# Patient Record
Sex: FEMALE | Race: WHITE | NOT HISPANIC OR LATINO | Employment: FULL TIME | URBAN - METROPOLITAN AREA
[De-identification: names, ages, dates, MRNs, and addresses within clinical notes are randomized per-mention and may not be internally consistent; named-entity substitution may affect disease eponyms.]

---

## 2019-08-09 ENCOUNTER — OFFICE VISIT (OUTPATIENT)
Dept: CARDIOLOGY | Facility: CLINIC | Age: 49
End: 2019-08-09

## 2019-08-09 ENCOUNTER — RESULTS ENCOUNTER (OUTPATIENT)
Dept: CARDIOLOGY | Facility: CLINIC | Age: 49
End: 2019-08-09

## 2019-08-09 VITALS
HEART RATE: 75 BPM | BODY MASS INDEX: 53.36 KG/M2 | DIASTOLIC BLOOD PRESSURE: 68 MMHG | HEIGHT: 61 IN | WEIGHT: 282.6 LBS | SYSTOLIC BLOOD PRESSURE: 142 MMHG | RESPIRATION RATE: 18 BRPM

## 2019-08-09 DIAGNOSIS — R60.0 BILATERAL LEG EDEMA: ICD-10-CM

## 2019-08-09 DIAGNOSIS — R60.0 EDEMA LEG: ICD-10-CM

## 2019-08-09 DIAGNOSIS — R06.02 SHORTNESS OF BREATH: Primary | ICD-10-CM

## 2019-08-09 DIAGNOSIS — I10 ESSENTIAL HYPERTENSION: ICD-10-CM

## 2019-08-09 DIAGNOSIS — R06.09 DOE (DYSPNEA ON EXERTION): ICD-10-CM

## 2019-08-09 DIAGNOSIS — R06.09 DYSPNEA ON EXERTION: ICD-10-CM

## 2019-08-09 DIAGNOSIS — E78.2 MIXED HYPERLIPIDEMIA: ICD-10-CM

## 2019-08-09 PROCEDURE — 99204 OFFICE O/P NEW MOD 45 MIN: CPT | Performed by: INTERNAL MEDICINE

## 2019-08-09 RX ORDER — HYDROCHLOROTHIAZIDE 25 MG/1
25 TABLET ORAL DAILY
COMMUNITY
End: 2020-01-13

## 2019-08-09 RX ORDER — METOPROLOL SUCCINATE 50 MG/1
50 TABLET, EXTENDED RELEASE ORAL DAILY
Qty: 30 TABLET | Refills: 5 | Status: SHIPPED | OUTPATIENT
Start: 2019-08-09 | End: 2020-02-24

## 2019-08-09 RX ORDER — POTASSIUM CHLORIDE 750 MG/1
10 TABLET, FILM COATED, EXTENDED RELEASE ORAL DAILY
Qty: 30 TABLET | Refills: 5 | Status: SHIPPED | OUTPATIENT
Start: 2019-08-09 | End: 2020-01-13 | Stop reason: SDUPTHER

## 2019-08-09 RX ORDER — ATORVASTATIN CALCIUM 10 MG/1
10 TABLET, FILM COATED ORAL DAILY
COMMUNITY

## 2019-08-09 RX ORDER — FUROSEMIDE 40 MG/1
40 TABLET ORAL DAILY
Qty: 30 TABLET | Refills: 5 | Status: SHIPPED | OUTPATIENT
Start: 2019-08-09 | End: 2020-01-13 | Stop reason: SDUPTHER

## 2019-08-09 NOTE — PROGRESS NOTES
[facility]  Salvatore Lee MD PhD  Cardiology clinic note initial encounter  Subjective:     Encounter Date:08/09/2019      Patient ID: Kirsten Myers is a 49 y.o. female.    Chief Complaint:  Chief Complaint   Patient presents with   • Abnormal ECG   • Cardiomyopathy   • Edema       HPI:  History of Present Illness  At the pleasure seeing this very pleasant 49-year-old female who presents to cardiology clinic as a referral for enlarged heart she says diagnosed by chest x-ray as well as some underlying cardiomyopathy which she told was fine in the past as well as an abnormal EKG.  Her EKG is unremarkable other than findings of possible LVH with 12 mm R wave complex in aVL for filling criteria of LVH.  She has not had an echo for diagnosis of true cardiomyopathy or LVH in the past.  She does have treatment for hypertension and hyperlipidemia as well as obstructive sleep apnea is compliant with her CPAP regularly.  She is morbidly obese at 282 pounds and over the past few weeks to months she has had increasing bilateral lower extremity peripheral edema.  She sees a nurse practitioner Christiana Perez NP 50 mg p.o. metoprolol daily.  She is on no other antihypertensive therapy at this time her blood pressure today is 142/68.  She is a non-smoker.  She is not currently diabetic.  She has a history of LAP-BAND in the past and was on 150 pounds but is gained 100 pounds over the last few years.  She is a non-smoker.  Family history diabetes hypertension CAD in her father as well as mother.  She presents today with dyspnea on exertion of NYHA II-III symptoms.  She does report that she eats very unhealthfully with fast food and a lot of salt content which is her downfall she says.  She has not noticed much difference in her urine output or swelling with the increase in HCTZ.  She denies chest pain palpitations syncope presyncope PND orthopnea or other heart failure signs or symptoms other than bilateral  "extremity peripheral edema.  She does have hyperlipidemia.  she is treated with Lipitor 10 Millers p.o. daily her most recent LDL was 78.  She has no history of coronary artery disease, peripheral vascular disease or cerebrovascular disease or renovascular disease.  She has no other complaints today on extensive review of systems other than back pain which is chronic.          For primary care who recently increased her HCTZ to 50 mg twice daily in combination with the following portions of the patient's history were reviewed and updated as appropriate: allergies, current medications, past family history, past medical history, past social history, past surgical history and problem list.    Problem List:  Patient Active Problem List   Diagnosis   • HLD (hyperlipidemia)   • HTN (hypertension)   • Hypothyroidism   • Bilateral leg edema   • Dyspnea on exertion       Past Medical History:  Past Medical History:   Diagnosis Date   • HLD (hyperlipidemia)    • HTN (hypertension)    • Hypothyroidism        Past Surgical History:  No past surgical history on file.    Social History:  Social History     Socioeconomic History   • Marital status:      Spouse name: Not on file   • Number of children: Not on file   • Years of education: Not on file   • Highest education level: Not on file   Tobacco Use   • Smoking status: Never Smoker   Substance and Sexual Activity   • Alcohol use: No     Frequency: Never   • Drug use: No   • Sexual activity: Defer       Allergies:  Allergies   Allergen Reactions   • Codeine Unknown (See Comments)     .   • Erythromycin GI Intolerance   • Penicillin G Sodium Unknown (See Comments)     .       Immunizations:    There is no immunization history on file for this patient.    ROS:  ROS  As above, negative x12 point review of systems otherwise mentioned above in the HPI     Objective:         /68 (BP Location: Left arm, Patient Position: Sitting)   Pulse 75   Resp 18   Ht 154.9 cm (61\") "   Wt 128 kg (282 lb 9.6 oz)   BMI 53.40 kg/m²     Physical Exam  No acute distress alert and oriented x3 afebrile vital signs stable  Normocephalic atraumatic pupils equal round reactive light extract was tight bilaterally  Trachea midline neck supple no carotid bruits no significant JVD although she does have mild HJR  Regular rate and rhythm no rubs or gallops, 1 out of 6 systolic ejection murmur left axilla, no heave or parasternal lift noted  Lungs clear to auscultation bilaterally in all fields  Abdomen obese soft nontender nondistended bowel sounds positive  Extremities no clubbing or cyanosis but she has 2+ bilateral lower extremity peripheral edema pitting in nature to the knees  No neurologic deficits grossly ambulatory normal gait and station  Normal mood and affect  No significant lymphadenopathy noted  Skin warm and dry  No bony abnormalities grossly    In-Office Procedure(s):  Procedures    ASCVD RIsk Score::  The ASCVD Risk score (Alexandre LIMA Jr., et al., 2013) failed to calculate for the following reasons:    Cannot find a previous HDL lab    Cannot find a previous total cholesterol lab    Recent Radiology:  Imaging Results (most recent)     None          Lab Review:   No visits with results within 6 Month(s) from this visit.   Latest known visit with results is:   Conversion Encounter on 02/07/2015   Component Date Value   • WBC 02/07/2015 8.9    • RBC 02/07/2015 4.22    • Hemoglobin 02/07/2015 11.0*   • Hematocrit 02/07/2015 34.5    • MCV 02/07/2015 82    • MCH 02/07/2015 26.1*   • MCHC 02/07/2015 31.9    • RDW 02/07/2015 13.9    • Platelets 02/07/2015 297    • Neutrophil Rel % 02/07/2015 71    • Lymphocyte Rel % 02/07/2015 21    • Monocyte Rel % 02/07/2015 5    • Eosinophil Rel % 02/07/2015 2    • Basophil Rel % 02/07/2015 1    • Immature Cells 02/07/2015 CANCELED    • Neutrophils Absolute 02/07/2015 6.3    • Lymphocytes Absolute 02/07/2015 1.8    • Monocytes Absolute 02/07/2015 0.5    • Eosinophils  Absolute 02/07/2015 0.2    • Basophils Absolute 02/07/2015 0.1    • Immature Granulocyte Rel* 02/07/2015 0    • Immature Grans Absolute 02/07/2015 0.0    • nRBC 02/07/2015 CANCELED    • Hematology Comments: 02/07/2015 CANCELED    • Glucose 02/07/2015 CANCELED    • BUN 02/07/2015 CANCELED    • Creatinine 02/07/2015 CANCELED    • eGFR Non  Am 02/07/2015 CANCELED    • eGFR  Am 02/07/2015 CANCELED    • BUN/Creatinine Ratio 02/07/2015 CANCELED    • Sodium 02/07/2015 CANCELED    • Potassium 02/07/2015 CANCELED    • Chloride 02/07/2015 CANCELED    • Total CO2 02/07/2015 CANCELED    • Calcium 02/07/2015 CANCELED    • Total Protein 02/07/2015 CANCELED    • Albumin 02/07/2015 CANCELED    • Globulin 02/07/2015 CANCELED    • A/G Ratio 02/07/2015 CANCELED    • Total Bilirubin 02/07/2015 CANCELED    • Alkaline Phosphatase 02/07/2015 CANCELED    • AST (SGOT) 02/07/2015 CANCELED    • ALT (SGPT) 02/07/2015 CANCELED    • Total Cholesterol 02/07/2015 CANCELED    • Triglycerides 02/07/2015 CANCELED    • HDL Cholesterol 02/07/2015 CANCELED    • VLDL Cholesterol 02/07/2015 CANCELED    • LDL Cholesterol  02/07/2015 CANCELED    • Comment 02/07/2015 CANCELED    • LDL/HDL Ratio 02/07/2015 CANCELED    • TSH 02/07/2015 CANCELED    • T4, Total 02/07/2015 CANCELED    • T3 Uptake 02/07/2015 CANCELED    • Free Thyroxine Index 02/07/2015 CANCELED    • 25 Hydroxy, Vitamin D 02/07/2015 CANCELED    • Vitamin B-12 02/07/2015 CANCELED                 Assessment:          Diagnosis Plan   1. Shortness of breath  Adult Transthoracic Echo Complete W/ Cont if Necessary Per Protocol   2. Essential hypertension  potassium chloride (K-DUR) 10 MEQ CR tablet    Comprehensive Metabolic Panel    CBC & Differential    metoprolol succinate XL (TOPROL-XL) 50 MG 24 hr tablet   3. Edema leg  furosemide (LASIX) 40 MG tablet   4. DAILY (dyspnea on exertion)  Adult Transthoracic Echo Complete W/ Cont if Necessary Per Protocol   5. Mixed hyperlipidemia     6.  Dyspnea on exertion     7. Bilateral leg edema            Plan:      Bilateral lower extremity peripheral edema and shortness of breath dyspnea on exertion  2D echo for structural functional evaluation  Lasix 40 mg p.o. daily and potassium 10 mEq daily as well, as she is been refractory to 50 mg of HCTZ, stop HCTZ  CBC and CMP today to recheck renal function and hemoglobin is no labs since 2015 as above     Salt free diet discussed with the patient as this is likely a significant contributor to her peripheral edema      hypertension  Not at goal  Lasix as above, recheck in 2 weeks on return to clinic, add losartan or ARB at that time as she has had significant cough with ACE inhibitors in the past  Twice daily blood pressure log until she returns to clinic  Continue metoprolol XL 50 daily    Hyperlipidemia  LDL 78  Continue atorvastatin 10 mg p.o. Daily    Questionable cardiomyopathy or cardiac enlargement, 2D echo for definitive evaluation    Diet and weight loss discussed with this patient at length along with cessation of all salt.      Return to clinic in 2 weeks for follow-up       It is a pleasure to be involved in this patient's cardiac care.  Continue to follow and will communicate results as they become available      Salvatore Lee MD, PhD  08/09/19  .

## 2019-08-21 ENCOUNTER — HOSPITAL ENCOUNTER (OUTPATIENT)
Dept: CARDIOLOGY | Facility: HOSPITAL | Age: 49
Discharge: HOME | End: 2019-08-21
Admitting: INTERNAL MEDICINE

## 2019-08-21 ENCOUNTER — LAB (OUTPATIENT)
Dept: LAB | Facility: HOSPITAL | Age: 49
End: 2019-08-21

## 2019-08-21 VITALS
DIASTOLIC BLOOD PRESSURE: 81 MMHG | WEIGHT: 282 LBS | BODY MASS INDEX: 53.24 KG/M2 | HEIGHT: 61 IN | HEART RATE: 68 BPM | SYSTOLIC BLOOD PRESSURE: 135 MMHG

## 2019-08-21 DIAGNOSIS — R06.09 DOE (DYSPNEA ON EXERTION): ICD-10-CM

## 2019-08-21 DIAGNOSIS — R06.02 SHORTNESS OF BREATH: ICD-10-CM

## 2019-08-21 DIAGNOSIS — I10 ESSENTIAL HYPERTENSION, MALIGNANT: Primary | ICD-10-CM

## 2019-08-21 LAB
ALBUMIN SERPL-MCNC: 4.3 G/DL (ref 3.5–5.2)
ALBUMIN/GLOB SERPL: 1.3 G/DL
ALP SERPL-CCNC: 75 U/L (ref 39–117)
ALT SERPL W P-5'-P-CCNC: 34 U/L (ref 1–33)
ANION GAP SERPL CALCULATED.3IONS-SCNC: 7.6 MMOL/L (ref 5–15)
AORTIC DIMENSIONLESS INDEX: 0.4 (DI)
AST SERPL-CCNC: 36 U/L (ref 1–32)
BASOPHILS # BLD AUTO: 0.07 10*3/MM3 (ref 0–0.2)
BASOPHILS NFR BLD AUTO: 0.8 % (ref 0–1.5)
BH CV ECHO MEAS - ACS: 2 CM
BH CV ECHO MEAS - AO MAX PG (FULL): 11.4 MMHG
BH CV ECHO MEAS - AO MAX PG: 14.6 MMHG
BH CV ECHO MEAS - AO MEAN PG (FULL): 5 MMHG
BH CV ECHO MEAS - AO MEAN PG: 7 MMHG
BH CV ECHO MEAS - AO V2 MAX: 191 CM/SEC
BH CV ECHO MEAS - AO V2 MEAN: 123 CM/SEC
BH CV ECHO MEAS - AO V2 VTI: 38.1 CM
BH CV ECHO MEAS - ASC AORTA: 2.5 CM
BH CV ECHO MEAS - AVA(I,A): 1.5 CM^2
BH CV ECHO MEAS - AVA(I,D): 1.5 CM^2
BH CV ECHO MEAS - AVA(V,A): 1.6 CM^2
BH CV ECHO MEAS - AVA(V,D): 1.6 CM^2
BH CV ECHO MEAS - BSA(HAYCOCK): 2.4 M^2
BH CV ECHO MEAS - BSA: 2.2 M^2
BH CV ECHO MEAS - BZI_BMI: 53.3 KILOGRAMS/M^2
BH CV ECHO MEAS - BZI_METRIC_HEIGHT: 154.9 CM
BH CV ECHO MEAS - BZI_METRIC_WEIGHT: 127.9 KG
BH CV ECHO MEAS - EDV(CUBED): 157.5 ML
BH CV ECHO MEAS - EDV(MOD-SP2): 118 ML
BH CV ECHO MEAS - EDV(MOD-SP4): 120 ML
BH CV ECHO MEAS - EDV(TEICH): 141.3 ML
BH CV ECHO MEAS - EF(CUBED): 79.2 %
BH CV ECHO MEAS - EF(MOD-BP): 72 %
BH CV ECHO MEAS - EF(MOD-SP2): 77.1 %
BH CV ECHO MEAS - EF(MOD-SP4): 78.3 %
BH CV ECHO MEAS - EF(TEICH): 71 %
BH CV ECHO MEAS - ESV(CUBED): 32.8 ML
BH CV ECHO MEAS - ESV(MOD-SP2): 27 ML
BH CV ECHO MEAS - ESV(MOD-SP4): 26 ML
BH CV ECHO MEAS - ESV(TEICH): 41 ML
BH CV ECHO MEAS - FS: 40.7 %
BH CV ECHO MEAS - IVS/LVPW: 1
BH CV ECHO MEAS - IVSD: 1 CM
BH CV ECHO MEAS - LAT PEAK E' VEL: 11.4 CM/SEC
BH CV ECHO MEAS - LV DIASTOLIC VOL/BSA (35-75): 54.9 ML/M^2
BH CV ECHO MEAS - LV MASS(C)D: 206.7 GRAMS
BH CV ECHO MEAS - LV MASS(C)DI: 94.6 GRAMS/M^2
BH CV ECHO MEAS - LV MAX PG: 3.1 MMHG
BH CV ECHO MEAS - LV MEAN PG: 2 MMHG
BH CV ECHO MEAS - LV SYSTOLIC VOL/BSA (12-30): 11.9 ML/M^2
BH CV ECHO MEAS - LV V1 MAX: 88.7 CM/SEC
BH CV ECHO MEAS - LV V1 MEAN: 57 CM/SEC
BH CV ECHO MEAS - LV V1 VTI: 16.8 CM
BH CV ECHO MEAS - LVIDD: 5.4 CM
BH CV ECHO MEAS - LVIDS: 3.2 CM
BH CV ECHO MEAS - LVLD AP2: 7.7 CM
BH CV ECHO MEAS - LVLD AP4: 7.8 CM
BH CV ECHO MEAS - LVLS AP2: 6.2 CM
BH CV ECHO MEAS - LVLS AP4: 6.2 CM
BH CV ECHO MEAS - LVOT AREA (M): 3.5 CM^2
BH CV ECHO MEAS - LVOT AREA: 3.5 CM^2
BH CV ECHO MEAS - LVOT DIAM: 2.1 CM
BH CV ECHO MEAS - LVPWD: 1 CM
BH CV ECHO MEAS - MED PEAK E' VEL: 11 CM/SEC
BH CV ECHO MEAS - MV A DUR: 0.17 SEC
BH CV ECHO MEAS - MV A MAX VEL: 91.7 CM/SEC
BH CV ECHO MEAS - MV DEC SLOPE: 375.3 CM/SEC^2
BH CV ECHO MEAS - MV DEC TIME: 248 SEC
BH CV ECHO MEAS - MV E MAX VEL: 120.5 CM/SEC
BH CV ECHO MEAS - MV E/A: 1.3
BH CV ECHO MEAS - MV MAX PG: 7 MMHG
BH CV ECHO MEAS - MV MEAN PG: 3 MMHG
BH CV ECHO MEAS - MV P1/2T MAX VEL: 143 CM/SEC
BH CV ECHO MEAS - MV P1/2T: 111.6 MSEC
BH CV ECHO MEAS - MV V2 MAX: 132 CM/SEC
BH CV ECHO MEAS - MV V2 MEAN: 72.3 CM/SEC
BH CV ECHO MEAS - MV V2 VTI: 36.6 CM
BH CV ECHO MEAS - MVA P1/2T LCG: 1.5 CM^2
BH CV ECHO MEAS - MVA(P1/2T): 2 CM^2
BH CV ECHO MEAS - MVA(VTI): 1.6 CM^2
BH CV ECHO MEAS - PULM A REVS DUR: 0.14 SEC
BH CV ECHO MEAS - PULM A REVS VEL: 32.7 CM/SEC
BH CV ECHO MEAS - PULM DIAS VEL: 59.8 CM/SEC
BH CV ECHO MEAS - PULM S/D: 1.4
BH CV ECHO MEAS - PULM SYS VEL: 81.8 CM/SEC
BH CV ECHO MEAS - RAP SYSTOLE: 3 MMHG
BH CV ECHO MEAS - RVOT AREA: 2.5 CM^2
BH CV ECHO MEAS - RVOT DIAM: 1.8 CM
BH CV ECHO MEAS - RVSP: 28 MMHG
BH CV ECHO MEAS - SI(CUBED): 57 ML/M^2
BH CV ECHO MEAS - SI(LVOT): 26.6 ML/M^2
BH CV ECHO MEAS - SI(MOD-SP2): 41.6 ML/M^2
BH CV ECHO MEAS - SI(MOD-SP4): 43 ML/M^2
BH CV ECHO MEAS - SI(TEICH): 45.9 ML/M^2
BH CV ECHO MEAS - SV(CUBED): 124.7 ML
BH CV ECHO MEAS - SV(LVOT): 58.2 ML
BH CV ECHO MEAS - SV(MOD-SP2): 91 ML
BH CV ECHO MEAS - SV(MOD-SP4): 94 ML
BH CV ECHO MEAS - SV(TEICH): 100.4 ML
BH CV ECHO MEAS - TAPSE (>1.6): 2.9 CM2
BH CV ECHO MEAS - TR MAX PG: 25
BH CV ECHO MEAS - TR MAX VEL: 248 CM/SEC
BH CV ECHO MEASUREMENTS AVERAGE E/E' RATIO: 10.76
BH CV VAS BP RIGHT ARM: NORMAL MMHG
BH CV XLRA - RV BASE: 3.3 CM
BH CV XLRA - TDI S': 17.8 CM/SEC
BILIRUB SERPL-MCNC: 0.9 MG/DL (ref 0.2–1.2)
BUN BLD-MCNC: 12 MG/DL (ref 6–20)
BUN/CREAT SERPL: 19.7 (ref 7–25)
CALCIUM SPEC-SCNC: 9.2 MG/DL (ref 8.6–10.5)
CHLORIDE SERPL-SCNC: 101 MMOL/L (ref 98–107)
CO2 SERPL-SCNC: 28.4 MMOL/L (ref 22–29)
CREAT BLD-MCNC: 0.61 MG/DL (ref 0.57–1)
DEPRECATED RDW RBC AUTO: 48.3 FL (ref 37–54)
EOSINOPHIL # BLD AUTO: 0.17 10*3/MM3 (ref 0–0.4)
EOSINOPHIL NFR BLD AUTO: 2 % (ref 0.3–6.2)
ERYTHROCYTE [DISTWIDTH] IN BLOOD BY AUTOMATED COUNT: 14.9 % (ref 12.3–15.4)
GFR SERPL CREATININE-BSD FRML MDRD: 103 ML/MIN/1.73
GLOBULIN UR ELPH-MCNC: 3.4 GM/DL
GLUCOSE BLD-MCNC: 110 MG/DL (ref 65–99)
HCT VFR BLD AUTO: 37.8 % (ref 34–46.6)
HGB BLD-MCNC: 11.6 G/DL (ref 12–15.9)
IMM GRANULOCYTES # BLD AUTO: 0.08 10*3/MM3 (ref 0–0.05)
IMM GRANULOCYTES NFR BLD AUTO: 0.9 % (ref 0–0.5)
LEFT ATRIUM VOLUME INDEX: 29.3 ML/M2
LYMPHOCYTES # BLD AUTO: 1.43 10*3/MM3 (ref 0.7–3.1)
LYMPHOCYTES NFR BLD AUTO: 16.8 % (ref 19.6–45.3)
MAXIMAL PREDICTED HEART RATE: 169 BPM
MCH RBC QN AUTO: 27.2 PG (ref 26.6–33)
MCHC RBC AUTO-ENTMCNC: 30.7 G/DL (ref 31.5–35.7)
MCV RBC AUTO: 88.5 FL (ref 79–97)
MONOCYTES # BLD AUTO: 0.54 10*3/MM3 (ref 0.1–0.9)
MONOCYTES NFR BLD AUTO: 6.3 % (ref 5–12)
NEUTROPHILS # BLD AUTO: 6.22 10*3/MM3 (ref 1.7–7)
NEUTROPHILS NFR BLD AUTO: 73.2 % (ref 42.7–76)
NRBC BLD AUTO-RTO: 0 /100 WBC (ref 0–0.2)
PLATELET # BLD AUTO: 209 10*3/MM3 (ref 140–450)
PMV BLD AUTO: 10.1 FL (ref 6–12)
POTASSIUM BLD-SCNC: 3.3 MMOL/L (ref 3.5–5.2)
PROT SERPL-MCNC: 7.7 G/DL (ref 6–8.5)
RBC # BLD AUTO: 4.27 10*6/MM3 (ref 3.77–5.28)
SODIUM BLD-SCNC: 137 MMOL/L (ref 136–145)
STRESS TARGET HR: 144 BPM
WBC NRBC COR # BLD: 8.51 10*3/MM3 (ref 3.4–10.8)

## 2019-08-21 PROCEDURE — 93306 TTE W/DOPPLER COMPLETE: CPT | Performed by: INTERNAL MEDICINE

## 2019-08-21 PROCEDURE — 36415 COLL VENOUS BLD VENIPUNCTURE: CPT

## 2019-08-21 PROCEDURE — 85025 COMPLETE CBC W/AUTO DIFF WBC: CPT

## 2019-08-21 PROCEDURE — 80053 COMPREHEN METABOLIC PANEL: CPT

## 2019-08-21 PROCEDURE — 93306 TTE W/DOPPLER COMPLETE: CPT

## 2020-01-13 ENCOUNTER — OFFICE VISIT (OUTPATIENT)
Dept: CARDIOLOGY | Facility: CLINIC | Age: 50
End: 2020-01-13

## 2020-01-13 VITALS
SYSTOLIC BLOOD PRESSURE: 148 MMHG | WEIGHT: 285.6 LBS | DIASTOLIC BLOOD PRESSURE: 90 MMHG | RESPIRATION RATE: 18 BRPM | HEIGHT: 61 IN | HEART RATE: 67 BPM | BODY MASS INDEX: 53.92 KG/M2

## 2020-01-13 DIAGNOSIS — R60.0 EDEMA LEG: ICD-10-CM

## 2020-01-13 DIAGNOSIS — Z92.89 HISTORY OF ECHOCARDIOGRAM: ICD-10-CM

## 2020-01-13 DIAGNOSIS — I10 ESSENTIAL HYPERTENSION: Primary | ICD-10-CM

## 2020-01-13 DIAGNOSIS — E78.2 MIXED HYPERLIPIDEMIA: ICD-10-CM

## 2020-01-13 DIAGNOSIS — R06.09 DYSPNEA ON EXERTION: ICD-10-CM

## 2020-01-13 PROCEDURE — 99214 OFFICE O/P EST MOD 30 MIN: CPT | Performed by: INTERNAL MEDICINE

## 2020-01-13 RX ORDER — POTASSIUM CHLORIDE 1500 MG/1
20 TABLET, FILM COATED, EXTENDED RELEASE ORAL DAILY
Qty: 30 TABLET | Refills: 5 | Status: SHIPPED | OUTPATIENT
Start: 2020-01-13

## 2020-01-13 RX ORDER — FUROSEMIDE 40 MG/1
40 TABLET ORAL 2 TIMES DAILY
Qty: 60 TABLET | Refills: 5 | Status: SHIPPED | OUTPATIENT
Start: 2020-01-13

## 2020-01-20 ENCOUNTER — RESULTS ENCOUNTER (OUTPATIENT)
Dept: CARDIOLOGY | Facility: CLINIC | Age: 50
End: 2020-01-20

## 2020-01-20 DIAGNOSIS — I10 ESSENTIAL HYPERTENSION: ICD-10-CM

## 2020-01-20 DIAGNOSIS — R60.0 EDEMA LEG: ICD-10-CM

## 2020-02-17 NOTE — PROGRESS NOTES
Cardiology clinic note  Salvatore Lee MD, PhD  [facility] cardiology  Subjective:     Encounter Date:01/13/2020      Patient ID: Kirsten Myers is a 49 y.o. female.    Chief Complaint:  Chief Complaint   Patient presents with   • Follow-up       HPI:  History of Present Illness  Per prior encounter  I had the pleasure seeing this very pleasant 49-year-old female who presents initially to cardiology clinic as a referral for enlarged heart she says diagnosed by chest x-ray as well as some underlying cardiomyopathy which she told was fine in the past as well as an abnormal EKG.  Her EKG was unremarkable other than findings of possible LVH with 12 mm R wave complex in aVL for filling criteria of LVH.  She had not had an echo for diagnosis of true cardiomyopathy or LVH at that time, She does have treatment for hypertension and hyperlipidemia as well as obstructive sleep apnea is compliant with her CPAP regularly.  She is morbidly obese at 282 pounds and over the past few weeks to months she has had increasing bilateral lower extremity peripheral edema.  She sees a nurse practitioner Christiana Perez NP 50 mg p.o. metoprolol daily.  She is on no other antihypertensive therapy at this time her blood pressure today is 142/68.  She is a non-smoker.  She is not currently diabetic.  She has a history of LAP-BAND in the past and was on 150 pounds but is gained 100 pounds over the last few years.  She is a non-smoker.  Family history diabetes hypertension CAD in her father as well as mother.  She presented initially with dyspnea on exertion of NYHA II-III symptoms.  She does report that she eats very unhealthfully with fast food and a lot of salt content which is her downfall she says.  She has not noticed much difference in her urine output or swelling with the increase in HCTZ.  She denies chest pain palpitations syncope presyncope PND orthopnea or other heart failure signs or symptoms other than bilateral extremity  peripheral edema.  She does have hyperlipidemia.  she is treated with Lipitor 10 milligrams p.o. daily her most recent LDL was 78.  She has no history of coronary artery disease, peripheral vascular disease or cerebrovascular disease or renovascular disease.      She presents back to clinic for 6-month follow-up blood pressure 140/90 with heart rate 67, weight 25 pounds which is stable from previous to 82 pounds.  Renal function stable by most recent labs in August 2019, previously she was placed on Lasix with mild improvement in her peripheral edema, EF 70%, normal RV size and function, normal atrial sizes, trace to mild MR only, normal pulmonary systolic pressure mostly hypertension.  She does have residual dyspnea exertion and some edema is minimal increase her Lasix today to 40 m p.o. twice daily in addition to cast placement.  She needs repeat labs BMP and TSH in 1 week and she can follow-up primary care.  She denies chest pain shortness of breath PND orthopnea right completely.  And hypertension mildly controlled 148/90 with goal less than 135 systolic.  2D echo was normal as above.  No complaints today.  Denies fever chills sweats nausea vomiting diarrhea or other complaints       Review of systems x14 review systems negative except as mentioned above  Historical data unchanged from EMR and other encounters     The following portions of the patient's history were reviewed and updated as appropriate: allergies, current medications, past family history, past medical history, past social history, past surgical history and problem list.    Problem List:  Patient Active Problem List   Diagnosis   • HLD (hyperlipidemia)   • HTN (hypertension)   • Hypothyroidism   • Bilateral leg edema   • Dyspnea on exertion   • History of echocardiogram       Past Medical History:  Past Medical History:   Diagnosis Date   • History of echocardiogram 08/21/2019    LV systolic function is normal. EF 72% Trace to mild MR.    • HLD  "(hyperlipidemia)    • HTN (hypertension)    • Hypothyroidism        Past Surgical History:  History reviewed. No pertinent surgical history.    Social History:  Social History     Socioeconomic History   • Marital status:      Spouse name: Not on file   • Number of children: Not on file   • Years of education: Not on file   • Highest education level: Not on file   Tobacco Use   • Smoking status: Never Smoker   Substance and Sexual Activity   • Alcohol use: No     Frequency: Never   • Drug use: No   • Sexual activity: Defer       Allergies:  Allergies   Allergen Reactions   • Codeine Unknown (See Comments)     .   • Erythromycin GI Intolerance   • Penicillin G Sodium Unknown (See Comments)     .       Immunizations:    There is no immunization history on file for this patient.    ROS:  ROS       Objective:         /90 (BP Location: Right arm, Patient Position: Sitting)   Pulse 67   Resp 18   Ht 154.9 cm (61\")   Wt 130 kg (285 lb 9.6 oz)   BMI 53.96 kg/m²     Physical Exam   Vitals reviewed  No acute distress alert and oriented x3 afebrile vital signs stable  Normocephalic atraumatic pupils equal round reactive light extract was tight bilaterally  Trachea midline neck supple no carotid bruits no significant JVD although she does have mild HJR  Regular rate and rhythm no rubs or gallops, 1 out of 6 systolic ejection murmur left axilla, no heave or parasternal lift noted  Lungs clear to auscultation bilaterally in all fields  Abdomen obese soft nontender nondistended bowel sounds positive  Extremities no clubbing or cyanosis but she has 1+ bilateral lower extremity peripheral edema pitting in nature to the knees  No neurologic deficits grossly ambulatory normal gait and station  Normal mood and affect  No significant lymphadenopathy noted  Skin warm and dry  No bony abnormalities grossly    Unchanged from prior encounter essentially.  In-Office Procedure(s):  Procedures    ASCVD RIsk Score::  The ASCVD " Risk score (Alexandre LIMA Jr., et al., 2013) failed to calculate for the following reasons:    Cannot find a previous HDL lab    Cannot find a previous total cholesterol lab    Recent Radiology:  Imaging Results (Most Recent)     None          Lab Review:   Hospital Outpatient Visit on 08/21/2019   Component Date Value   • BSA 08/21/2019 2.2    • IVSd 08/21/2019 1.0    • LVIDd 08/21/2019 5.4    • LVIDs 08/21/2019 3.2    • LVPWd 08/21/2019 1.0    • IVS/LVPW 08/21/2019 1.0    • FS 08/21/2019 40.7    • EDV(Teich) 08/21/2019 141.3    • ESV(Teich) 08/21/2019 41.0    • EF(Teich) 08/21/2019 71.0    • EDV(cubed) 08/21/2019 157.5    • ESV(cubed) 08/21/2019 32.8    • EF(cubed) 08/21/2019 79.2    • LV mass(C)d 08/21/2019 206.7    • LV mass(C)dI 08/21/2019 94.6    • SV(Teich) 08/21/2019 100.4    • SI(Teich) 08/21/2019 45.9    • SV(cubed) 08/21/2019 124.7    • SI(cubed) 08/21/2019 57.0    • ACS 08/21/2019 2.0    • asc Aorta Diam 08/21/2019 2.5    • LVOT diam 08/21/2019 2.1    • LVOT area 08/21/2019 3.5    • LVOT area(traced) 08/21/2019 3.5    • RVOT diam 08/21/2019 1.8    • RVOT area 08/21/2019 2.5    • LVLd ap4 08/21/2019 7.8    • EDV(MOD-sp4) 08/21/2019 120.0    • LVLs ap4 08/21/2019 6.2    • ESV(MOD-sp4) 08/21/2019 26.0    • EF(MOD-sp4) 08/21/2019 78.3    • LVLd ap2 08/21/2019 7.7    • EDV(MOD-sp2) 08/21/2019 118.0    • LVLs ap2 08/21/2019 6.2    • ESV(MOD-sp2) 08/21/2019 27.0    • EF(MOD-sp2) 08/21/2019 77.1    • SV(MOD-sp4) 08/21/2019 94.0    • SI(MOD-sp4) 08/21/2019 43.0    • SV(MOD-sp2) 08/21/2019 91.0    • SI(MOD-sp2) 08/21/2019 41.6    • LV Singleton Vol (BSA correct* 08/21/2019 54.9    • LV Sys Vol (BSA correcte* 08/21/2019 11.9    • MV A dur 08/21/2019 0.17    • MV E max radha 08/21/2019 120.5    • MV A max radha 08/21/2019 91.7    • MV E/A 08/21/2019 1.3    • MV V2 max 08/21/2019 132.0    • MV max PG 08/21/2019 7.0    • MV V2 mean 08/21/2019 72.3    • MV mean PG 08/21/2019 3.0    • MV V2 VTI 08/21/2019 36.6    • MVA(VTI) 08/21/2019  1.6    • MV P1/2t max norris 08/21/2019 143.0    • MV P1/2t 08/21/2019 111.6    • MVA(P1/2t) 08/21/2019 2.0    • MV dec slope 08/21/2019 375.3    • MV dec time 08/21/2019 248    • Ao pk norris 08/21/2019 191.0    • Ao max PG 08/21/2019 14.6    • Ao max PG (full) 08/21/2019 11.4    • Ao V2 mean 08/21/2019 123.0    • Ao mean PG 08/21/2019 7.0    • Ao mean PG (full) 08/21/2019 5.0    • Ao V2 VTI 08/21/2019 38.1    • HENRY(I,A) 08/21/2019 1.5    • HENRY(I,D) 08/21/2019 1.5    • HENRY(V,A) 08/21/2019 1.6    • HENRY(V,D) 08/21/2019 1.6    • LV V1 max PG 08/21/2019 3.1    • LV V1 mean PG 08/21/2019 2.0    • LV V1 max 08/21/2019 88.7    • LV V1 mean 08/21/2019 57.0    • LV V1 VTI 08/21/2019 16.8    • SV(LVOT) 08/21/2019 58.2    • SI(LVOT) 08/21/2019 26.6    • TR max norris 08/21/2019 248.0    • Pulm Sys Norris 08/21/2019 81.8    • Pulm Singleton Norris 08/21/2019 59.8    • Pulm S/D 08/21/2019 1.4    • Pulm A Revs Dur 08/21/2019 0.14    • Pulm A Revs Norris 08/21/2019 32.7    • MVA P1/2T LCG 08/21/2019 1.5    •  CV ECHO GIOVANI - BZI_BMI 08/21/2019 53.3    •  CV ECHO GIOVANI - BSA(HA* 08/21/2019 2.4    •  CV ECHO GIOVANI - BZI_ME* 08/21/2019 127.9    •  CV ECHO GIOVANI - BZI_ME* 08/21/2019 154.9    • Target HR (85%) 08/21/2019 144    • Max. Pred. HR (100%) 08/21/2019 169    • BH CV VAS BP RIGHT ARM 08/21/2019 135/81    • TDI S' 08/21/2019 17.80    • RV Base 08/21/2019 3.30    • Dimensionless Index 08/21/2019 0.4    • LA Volume Index 08/21/2019 29.3    • Avg E/e' ratio 08/21/2019 10.76    • EF(MOD-bp) 08/21/2019 72    • Lat Peak E' Norris 08/21/2019 11.4    • Med Peak E' Norris 08/21/2019 11.00    • RAP systole 08/21/2019 3    • RVSP(TR) 08/21/2019 28    • TR max PG 08/21/2019 25    • TAPSE (>1.6) 08/21/2019 2.90    Lab on 08/21/2019   Component Date Value   • Glucose 08/21/2019 110*   • BUN 08/21/2019 12    • Creatinine 08/21/2019 0.61    • Sodium 08/21/2019 137    • Potassium 08/21/2019 3.3*   • Chloride 08/21/2019 101    • CO2 08/21/2019 28.4    • Calcium  08/21/2019 9.2    • Total Protein 08/21/2019 7.7    • Albumin 08/21/2019 4.30    • ALT (SGPT) 08/21/2019 34*   • AST (SGOT) 08/21/2019 36*   • Alkaline Phosphatase 08/21/2019 75    • Total Bilirubin 08/21/2019 0.9    • eGFR Non African Amer 08/21/2019 103    • Globulin 08/21/2019 3.4    • A/G Ratio 08/21/2019 1.3    • BUN/Creatinine Ratio 08/21/2019 19.7    • Anion Gap 08/21/2019 7.6    • WBC 08/21/2019 8.51    • RBC 08/21/2019 4.27    • Hemoglobin 08/21/2019 11.6*   • Hematocrit 08/21/2019 37.8    • MCV 08/21/2019 88.5    • MCH 08/21/2019 27.2    • MCHC 08/21/2019 30.7*   • RDW 08/21/2019 14.9    • RDW-SD 08/21/2019 48.3    • MPV 08/21/2019 10.1    • Platelets 08/21/2019 209    • Neutrophil % 08/21/2019 73.2    • Lymphocyte % 08/21/2019 16.8*   • Monocyte % 08/21/2019 6.3    • Eosinophil % 08/21/2019 2.0    • Basophil % 08/21/2019 0.8    • Immature Grans % 08/21/2019 0.9*   • Neutrophils, Absolute 08/21/2019 6.22    • Lymphocytes, Absolute 08/21/2019 1.43    • Monocytes, Absolute 08/21/2019 0.54    • Eosinophils, Absolute 08/21/2019 0.17    • Basophils, Absolute 08/21/2019 0.07    • Immature Grans, Absolute 08/21/2019 0.08*   • nRBC 08/21/2019 0.0         Above labs and 2D echo indices reviewed        Assessment:          Diagnosis Plan   1. Essential hypertension  Basic Metabolic Panel    TSH    potassium chloride (K-TAB) 20 MEQ tablet controlled-release ER tablet    Basic Metabolic Panel   2. Edema leg  furosemide (LASIX) 40 MG tablet    Basic Metabolic Panel   3. Dyspnea on exertion     4. Mixed hyperlipidemia     5. History of echocardiogram            Plan:      Bilateral lower extremity peripheral edema and shortness of breath dyspnea on exertion  2D echo normal LV structure and function EF 70%.  Mild MR only, normal PASP  Increase Lasix to 40 mg p.o. twice daily and potassium placement to 20 daily  BMP in 1 week to follow-up     Salt free diet discussed with the patient as this is likely a significant  contributor to her peripheral edema        hypertension  Not at goal  Lasix as above, twice daily blood pressure logs prior to next clinic, she can call clinic with results, if not goal would start ARB or ACE inhibitor  Continue metoprolol XL 50 daily     Hyperlipidemia  LDL 78  Continue atorvastatin 10 mg p.o. Daily          Diet and weight loss discussed with this patient at length along with cessation of all salt.        Return to clinic in 6 months for follow-up, follow-up with primary care in the interim for optimization of antihypertensive therapy    Primary prevention goals for CAD and cardiac comorbidities of stroke or peripheral vascular disease.     It is a pleasure to be involved in this patient's cardiac care.      Please call any questions or concerns     Sincerely,  Salvatore Lee MD, PhD      Greater than 25 minutes face-to-face with patient today greater than percent time on education on low-salt diet clinical restriction, diet exercise goals per guidelines, antihypertensive therapy, goals of blood pressure per JNC 8 guidelines and questions answered proposed to the patient today.         Salvatore Lee MD  02/17/20  .

## 2020-02-23 DIAGNOSIS — I10 ESSENTIAL HYPERTENSION: ICD-10-CM

## 2020-02-24 RX ORDER — METOPROLOL SUCCINATE 50 MG/1
TABLET, EXTENDED RELEASE ORAL
Qty: 30 TABLET | Refills: 5 | Status: SHIPPED | OUTPATIENT
Start: 2020-02-24 | End: 2020-12-14

## 2020-04-17 DIAGNOSIS — I10 ESSENTIAL HYPERTENSION: ICD-10-CM

## 2020-04-17 RX ORDER — POTASSIUM CHLORIDE 750 MG/1
TABLET, FILM COATED, EXTENDED RELEASE ORAL
Qty: 30 TABLET | Refills: 0 | OUTPATIENT
Start: 2020-04-17

## 2020-12-12 DIAGNOSIS — I10 ESSENTIAL HYPERTENSION: ICD-10-CM

## 2020-12-14 RX ORDER — METOPROLOL SUCCINATE 50 MG/1
TABLET, EXTENDED RELEASE ORAL
Qty: 30 TABLET | Refills: 0 | Status: SHIPPED | OUTPATIENT
Start: 2020-12-14 | End: 2021-01-21

## 2021-01-21 DIAGNOSIS — I10 ESSENTIAL HYPERTENSION: ICD-10-CM

## 2021-01-22 RX ORDER — METOPROLOL SUCCINATE 50 MG/1
TABLET, EXTENDED RELEASE ORAL
Qty: 30 TABLET | Refills: 0 | Status: SHIPPED | OUTPATIENT
Start: 2021-01-22

## 2021-04-13 DIAGNOSIS — I10 ESSENTIAL HYPERTENSION: ICD-10-CM

## 2021-04-13 RX ORDER — POTASSIUM CHLORIDE 1500 MG/1
TABLET, FILM COATED, EXTENDED RELEASE ORAL
Qty: 30 TABLET | Refills: 0 | OUTPATIENT
Start: 2021-04-13

## 2024-08-22 ENCOUNTER — TELEPHONE (OUTPATIENT)
Dept: ORTHOPEDIC SURGERY | Facility: CLINIC | Age: 54
End: 2024-08-22
Payer: COMMERCIAL

## 2024-08-22 NOTE — TELEPHONE ENCOUNTER
Caller: PATIENT    Relationship to patient: SELF     Best call back number: [phone]    Additional Notes: PATIENT HAD A REFERRAL TO YOUR OFFICE FOR RIGHT ELBOW PAIN & THE REFERRAL COMMUNICATION STATED TO SCHEDULE IN EARLY SEPTEMBER WITH ANY PROVIDER. WHILE ON THE PHONE PATIENT INFORMED ME IT WAS AN ELBOW FRACTURE NOT JUST ELBOW PAIN. PATIENT HAD AN XRAY ON 08/05/24 AT [facility] AND THE FRACTURE WAS CONFIRMED AT THAT TIME. I WENT AHEAD & SCHEDULED THE PATIENT FOR EARLY SEPTEMBER LIKE THE REFERRAL STATED BUT WANTED TO CONFIRM IF THE APPOINTMENT ON 09/05/24 AT 8:45 AM IS OKAY. THIS IS A SECOND OPINION DUE TO PATIENT PREVIOUSLY SEEING MARGOT FAJARDO. IF PATIENT NEEDS TO BE SEEN SOONER DUE TO DIAGNOSIS BEING AN ELBOW FRACTURE PLEASE CONTACT PATIENT FOR A SOONER APPOINTMENT. THANK YOU!

## 2024-09-05 ENCOUNTER — OFFICE VISIT (OUTPATIENT)
Dept: ORTHOPEDIC SURGERY | Facility: CLINIC | Age: 54
End: 2024-09-05
Payer: COMMERCIAL

## 2024-09-05 VITALS
OXYGEN SATURATION: 95 % | DIASTOLIC BLOOD PRESSURE: 75 MMHG | HEIGHT: 60 IN | SYSTOLIC BLOOD PRESSURE: 144 MMHG | HEART RATE: 57 BPM | BODY MASS INDEX: 57.52 KG/M2 | WEIGHT: 293 LBS

## 2024-09-05 DIAGNOSIS — M25.521 RIGHT ELBOW PAIN: Primary | ICD-10-CM

## 2024-09-05 DIAGNOSIS — S52.124A CLOSED NONDISPLACED FRACTURE OF HEAD OF RIGHT RADIUS, INITIAL ENCOUNTER: ICD-10-CM

## 2024-09-05 PROCEDURE — 99203 OFFICE O/P NEW LOW 30 MIN: CPT | Performed by: ORTHOPAEDIC SURGERY

## 2024-09-05 NOTE — PROGRESS NOTES
"Chief Complaint  Initial Evaluation of the Right Elbow     Subjective      Kirsten Myers presents to [facility] for initial evaluation of the right elbow. She needs a left total knee arthroplasty and her knee gave out and she had a fall on the right arm on 8/1/24. She had had X rays 8/5/24.  She noted that she had a fracture. She went to orthopedic in [place] and noted she still has decrease ROM and pain and tightness.  She notes her ROM is better then after the injury.     Allergies   Allergen Reactions    Codeine Unknown (See Comments)     .    Erythromycin GI Intolerance    Penicillin G Sodium Unknown (See Comments)     .        Social History     Socioeconomic History    Marital status:    Tobacco Use    Smoking status: Never    Smokeless tobacco: Never   Vaping Use    Vaping status: Never Used   Substance and Sexual Activity    Alcohol use: No    Drug use: No    Sexual activity: Defer        I reviewed the patient's chief complaint, history of present illness, review of systems, past medical history, surgical history, family history, social history, medications, and allergy list.     Review of Systems     Constitutional: Denies fevers, chills, weight loss  Cardiovascular: Denies chest pain, shortness of breath  Skin: Denies rashes, acute skin changes  Neurologic: Denies headache, loss of consciousness        Vital Signs:   /75   Pulse 57   Ht 152.4 cm (60\")   Wt 134 kg (295 lb)   SpO2 95%   BMI 57.61 kg/m²          Physical Exam  General: Alert. No acute distress    Ortho Exam        RIGHT ELBOW Non-tender lateral epicondyle. Non-tender medial epicondyle. Non-tender radial head. Sensation to light touch median, radial, ulnar nerve. Positive AIN, PIN, ulnar nerve motor function. Axillary sensation intact. Elbow ROM -. Negative Tinel's at the elbow. no pain with resisted wrist and long finger extension.        Procedures      Imaging Results (Most " Recent)       Procedure Component Value Units Date/Time    XR Elbow 2 View Right [[id number]] Resulted: 09/05/24 0848     Updated: 09/05/24 0850             Result Review :     X-Ray Report:  Right elbow X-Ray  Indication: Evaluation of the right elbow   AP/Lateral view(s)  Findings: Oblique radial neck fracture with interval healing.   Prior studies available for comparison: no     8/6/24    EXAM: CR Elbow Min 3 Vws RT     NUMBER OF IMAGES:  3     Examination: Radiographs of the right elbow     Clinical statement: 54 years old Female with right elbow pain status post fall one week ago     Technique: 3 views of the right elbow dated 8/5/2024 17:33.     Comparison: No prior studies available for comparison.     Findings: There is an oblique, displaced fracture of the radial head with fracture line extending to the surface. A joint effusion is identified. Mild soft tissue swelling is seen about the right elbow. There is a well-corticated 1.4 cm curvilinear calcification overlying the lateral epicondyle, which likely represents the sequelae of calcific bursitis/tendinitis. No radiopaque foreign body is identified.     Impression: Right elbow fracture, as above.         Assessment and Plan     Diagnoses and all orders for this visit:    1. Right elbow pain (Primary)  -     XR Elbow 2 View Right    2. Closed nondisplaced fracture of head of right radius, initial encounter        Discussed the treatment plan with the patient. I reviewed the X-rays that were obtained today with the patient.     Prescribed physical therapy.  Work note given.      Will X ray the next visit.        Call or return if worsening symptoms.    Follow Up     3-4 weeks with new X ray of the right elbow.     Patient was given instructions and counseling regarding her condition or for health maintenance advice. Please see specific information pulled into the AVS if appropriate.     Scribed for Judd Youngblood MD by Tracie Brink MA.  09/05/24    08:49 EDT    I have personally performed the services described in this document as scribed by the above individual and it is both accurate and complete. Judd Youngblood MD 09/05/24

## 2024-09-26 ENCOUNTER — OFFICE VISIT (OUTPATIENT)
Dept: ORTHOPEDIC SURGERY | Facility: CLINIC | Age: 54
End: 2024-09-26
Payer: COMMERCIAL

## 2024-09-26 VITALS
WEIGHT: 293 LBS | OXYGEN SATURATION: 96 % | DIASTOLIC BLOOD PRESSURE: 76 MMHG | SYSTOLIC BLOOD PRESSURE: 175 MMHG | BODY MASS INDEX: 57.52 KG/M2 | HEIGHT: 60 IN | HEART RATE: 63 BPM

## 2024-09-26 DIAGNOSIS — S52.124D CLOSED NONDISPLACED FRACTURE OF HEAD OF RIGHT RADIUS WITH ROUTINE HEALING, SUBSEQUENT ENCOUNTER: Primary | ICD-10-CM

## 2024-10-29 ENCOUNTER — OFFICE VISIT (OUTPATIENT)
Dept: ORTHOPEDIC SURGERY | Facility: CLINIC | Age: 54
End: 2024-10-29
Payer: COMMERCIAL

## 2024-10-29 VITALS
BODY MASS INDEX: 57.52 KG/M2 | HEART RATE: 58 BPM | SYSTOLIC BLOOD PRESSURE: 115 MMHG | HEIGHT: 60 IN | WEIGHT: 293 LBS | OXYGEN SATURATION: 94 % | DIASTOLIC BLOOD PRESSURE: 69 MMHG

## 2024-10-29 DIAGNOSIS — S52.124D CLOSED NONDISPLACED FRACTURE OF HEAD OF RIGHT RADIUS WITH ROUTINE HEALING, SUBSEQUENT ENCOUNTER: Primary | ICD-10-CM

## 2024-10-29 RX ORDER — SPIRONOLACTONE 25 MG/1
2 TABLET ORAL DAILY
COMMUNITY
Start: 2024-09-12

## 2024-10-29 RX ORDER — MELOXICAM 15 MG/1
15 TABLET ORAL DAILY
Qty: 30 TABLET | Refills: 2 | Status: SHIPPED | OUTPATIENT
Start: 2024-10-29

## 2024-10-29 NOTE — PATIENT INSTRUCTIONS
X-ray taken and reviewed.  Fracture is stable and healing routinely.    Note for patient to return full duty to work tomorrow.  Gradually resume normal activities as tolerated.  Continue home exercises.  Mobic sent to pharmacy.    Follow-up in 4 to 6 weeks with repeat x-ray for final checkup.  If patient is doing well, may consider as needed follow-up after this appointment.

## 2024-10-29 NOTE — PROGRESS NOTES
"Chief Complaint  Follow-up of the Right Elbow    Subjective      Kirsten Myers presents to [facility] for follow-up of right elbow radial head fracture that occurred as the patient had a fall on 8/1/2024 as her knee gave out.  Patient has been in physical therapy making progress.  She is attending at [facility] and is scheduled for 1 more day.  Reports that she feels she is getting back to normal activities and doing much better.  She still has pain with certain motions.  She feels she is ready to go back to work full duty.    Objective   Allergies   Allergen Reactions    Codeine Unknown (See Comments)     .    Erythromycin GI Intolerance    Penicillin G Sodium Unknown (See Comments)     .       Vital Signs:   /69   Pulse 58   Ht 152.4 cm (60\")   Wt 134 kg (295 lb)   SpO2 94%   BMI 57.61 kg/m²       Physical Exam    Constitutional: Awake, alert. Well nourished appearance.    Integumentary: Warm, dry, intact. No obvious rashes.    HENT: Atraumatic, normocephalic.   Respiratory: Non labored respirations .   Cardiovascular: Intact peripheral pulses.    Psychiatric: Normal mood and affect. A&O X3    Ortho Exam  Right elbow: Near full range of motion with extension lacking 5 degrees, contralateral arm lacks 3 degrees, flexion is near full and consistent with contralateral arm.  Supination pronation is full.  She is able make a tight fist.  Thumb to finger opposition is intact.  Sensation is intact throughout.    Imaging Results (Most Recent)       Procedure Component Value Units Date/Time    XR Elbow 2 View Right [[id number]] Resulted: 10/29/24 1534     Updated: 10/29/24 1534    Narrative:      X-Ray Report:  Study: X-rays ordered, taken in the office, and reviewed today.   Site: Right elbow xray  Indication: Radial head fracture  View: AP/Lateral view(s)  Findings: Oblique radial head fracture with stable alignment and routine   healing in comparison to previous x-rays.  Prior studies " available for comparison: yes                       Assessment and Plan   Problem List Items Addressed This Visit    None  Visit Diagnoses       Closed nondisplaced fracture of head of right radius with routine healing, subsequent encounter    -  Primary    Relevant Orders    XR Elbow 2 View Right (Completed)            Follow Up   Return in about 6 weeks (around 12/10/2024).    Patient is a non-smoker, did not discuss options for smoking cessation.     Social History     Socioeconomic History    Marital status:    Tobacco Use    Smoking status: Never    Smokeless tobacco: Never   Vaping Use    Vaping status: Never Used   Substance and Sexual Activity    Alcohol use: No    Drug use: No    Sexual activity: Defer       Patient Instructions   X-ray taken and reviewed.  Fracture is stable and healing routinely.    Note for patient to return full duty to work tomorrow.  Gradually resume normal activities as tolerated.  Continue home exercises.  Mobic sent to pharmacy.    Follow-up in 4 to 6 weeks with repeat x-ray for final checkup.  If patient is doing well, may consider as needed follow-up after this appointment.  Patient was given instructions and counseling regarding her condition or for health maintenance advice. Please see specific information pulled into the AVS if appropriate.

## 2025-05-15 ENCOUNTER — OFFICE VISIT (OUTPATIENT)
Dept: ORTHOPEDIC SURGERY | Facility: CLINIC | Age: 55
End: 2025-05-15
Payer: COMMERCIAL

## 2025-05-15 VITALS — WEIGHT: 293 LBS | BODY MASS INDEX: 57.52 KG/M2 | HEIGHT: 60 IN

## 2025-05-15 DIAGNOSIS — M75.42 IMPINGEMENT SYNDROME OF LEFT SHOULDER: ICD-10-CM

## 2025-05-15 DIAGNOSIS — M25.512 LEFT SHOULDER PAIN, UNSPECIFIED CHRONICITY: Primary | ICD-10-CM

## 2025-05-15 RX ORDER — HYDROCHLOROTHIAZIDE 12.5 MG/1
TABLET ORAL
COMMUNITY
Start: 2025-03-03

## 2025-05-15 RX ADMIN — LIDOCAINE HYDROCHLORIDE 5 ML: 10 INJECTION, SOLUTION INFILTRATION; PERINEURAL at 16:17

## 2025-05-15 RX ADMIN — TRIAMCINOLONE ACETONIDE 40 MG: 40 INJECTION, SUSPENSION INTRA-ARTICULAR; INTRAMUSCULAR at 16:17

## 2025-05-15 NOTE — PROGRESS NOTES
"Chief Complaint  Initial Evaluation of the Left Shoulder     Subjective      Kirsten Myers presents to [facility] for initial evaluation of the left shoulder.  She has pain for about a week.  She has pain on the lateral aspect of the shoulder and down the arm.  She has pain in the bicipital groove.  She has pain with internal and external rotation of the shoulder.  She has had no injury or fall.  She is having night time pain.      Allergies   Allergen Reactions    Ace Inhibitors Cough, Other (See Comments) and Unknown (See Comments)     cough   Other reaction(s): Unknown   cough    cough    Codeine Unknown (See Comments)     .    Erythromycin GI Intolerance    Penicillin G Sodium Unknown (See Comments)     .        Social History     Socioeconomic History    Marital status:    Tobacco Use    Smoking status: Never    Smokeless tobacco: Never   Vaping Use    Vaping status: Never Used   Substance and Sexual Activity    Alcohol use: No    Drug use: No    Sexual activity: Defer        I reviewed the patient's chief complaint, history of present illness, review of systems, past medical history, surgical history, family history, social history, medications, and allergy list.     Review of Systems     Constitutional: Denies fevers, chills, weight loss  Cardiovascular: Denies chest pain, shortness of breath  Skin: Denies rashes, acute skin changes  Neurologic: Denies headache, loss of consciousness        Vital Signs:   Ht 152.4 cm (60\")   Wt 134 kg (295 lb)   BMI 57.61 kg/m²          Physical Exam  General: Alert. No acute distress    Ortho Exam        LEFT SHOULDER Forward flexion 100. Abduction 80. External rotation 40. Internal rotation SI joint. Positive Cross body adduction. Supraspinatus strength 4/5. Infraspinatus Strength 4+/5. Infrared subscap 4+/5. Positive Hudson. Positive Neer. Negative Apprehension. Negative Lift off. (Negative Obriens. Sensation intact to light " touch, median, radial, ulnar nerve. P Tender to palpation to the shoulder, down the arm, bicipital groove and internal and external rotation.        Large Joint: L subacromial bursa  Date/Time: 5/15/2025 4:17 PM  Consent given by: patient  Site marked: site marked  Timeout: Immediately prior to procedure a time out was called to verify the correct patient, procedure, equipment, support staff and site/side marked as required   Supporting Documentation  Indications: pain   Procedure Details  Location: shoulder - L subacromial bursa  Preparation: Patient was prepped and draped in the usual sterile fashion  Needle gauge: 21 G.  Medications administered: 5 mL lidocaine 1 %; 40 mg triamcinolone acetonide 40 MG/ML  Patient tolerance: patient tolerated the procedure well with no immediate complications    This injection documentation was Scribed for Judd Youngblood MD by Yvette Bird MA.  05/15/25   16:17 EDT      Imaging Results (Most Recent)       Procedure Component Value Units Date/Time    XR Scapula Left [[id number]] Resulted: 05/15/25 1548     Updated: 05/15/25 1551             Result Review :     X-Ray Report:  Left scapula X-Ray  Indication: Evaluation of the left scapula  AP/Lateral view(s)  Findings: Mild AC joint arthritis.  No acute injury or fall.    Prior studies available for comparison: no        Assessment and Plan     Diagnoses and all orders for this visit:    1. Left shoulder pain, unspecified chronicity (Primary)  -     XR Scapula Left    2. Impingement syndrome of left shoulder        Discussed the treatment plan with the patient. I reviewed the X-rays that were obtained today with the patient.     Discussed the risks and benefits of conservative measures. The patient expressed understanding and wished to proceed with a left shoulder steroid injection.  She tolerated the injection well.     Discussed with the patient that due to the steroid injection given today in the office they may see an increase  in blood sugar for a few days. Advised patient to monitor sugar after receiving the injection.     Discussed possibility of a reaction from the injection.  Discussed the possibility that the injection may not completely improve or remove the pain.  Discussed the risk of infection.    HEP exercises given for shoulder impingement.        Call or return if worsening symptoms.    Follow Up     PRN      Patient was given instructions and counseling regarding her condition or for health maintenance advice. Please see specific information pulled into the AVS if appropriate.     Scribed for Judd Youngblood MD by Tracie Brink MA.  05/15/25   15:46 EDT    I have personally performed the services described in this document as scribed by the above individual and it is both accurate and complete. Judd Youngblood MD 05/16/25

## 2025-05-16 RX ORDER — TRIAMCINOLONE ACETONIDE 40 MG/ML
40 INJECTION, SUSPENSION INTRA-ARTICULAR; INTRAMUSCULAR
Status: COMPLETED | OUTPATIENT
Start: 2025-05-15 | End: 2025-05-15

## 2025-05-16 RX ORDER — LIDOCAINE HYDROCHLORIDE 10 MG/ML
5 INJECTION, SOLUTION INFILTRATION; PERINEURAL
Status: COMPLETED | OUTPATIENT
Start: 2025-05-15 | End: 2025-05-15

## 2025-06-12 ENCOUNTER — OFFICE VISIT (OUTPATIENT)
Dept: ORTHOPEDIC SURGERY | Facility: CLINIC | Age: 55
End: 2025-06-12
Payer: COMMERCIAL

## 2025-06-12 VITALS
WEIGHT: 293 LBS | HEART RATE: 77 BPM | SYSTOLIC BLOOD PRESSURE: 140 MMHG | HEIGHT: 60 IN | OXYGEN SATURATION: 96 % | DIASTOLIC BLOOD PRESSURE: 77 MMHG | BODY MASS INDEX: 57.52 KG/M2

## 2025-06-12 DIAGNOSIS — M75.42 IMPINGEMENT SYNDROME OF LEFT SHOULDER: ICD-10-CM

## 2025-06-12 DIAGNOSIS — M54.12 CERVICAL RADICULOPATHY: ICD-10-CM

## 2025-06-12 DIAGNOSIS — M25.512 LEFT SHOULDER PAIN, UNSPECIFIED CHRONICITY: Primary | ICD-10-CM

## 2025-06-12 NOTE — PROGRESS NOTES
"Chief Complaint  Numbness and Pain of the Cervical Spine and Initial Evaluation of the Left Shoulder     Subjective      Kirsten Myers presents to [facility] for initial evaluation of the left shoulder.  She had an X ray and is here to review.  She started having left shoulder pain and then started having some numbness and tingling down the arm.  She has had some previous surgery of the cervical spine and it is similar to when that happened in the past.  She is still having pain in the shoulder.  She had an injection on 5/15/25 and that gave relief for 2 weeks.  She has been doing exercises at home and taking anti inflammatory.      Allergies   Allergen Reactions    Ace Inhibitors Cough, Other (See Comments) and Unknown (See Comments)     cough   Other reaction(s): Unknown   cough    cough    Codeine Unknown (See Comments)     .    Erythromycin GI Intolerance    Penicillin G Sodium Unknown (See Comments)     .        Social History     Socioeconomic History    Marital status:    Tobacco Use    Smoking status: Never    Smokeless tobacco: Never   Vaping Use    Vaping status: Never Used   Substance and Sexual Activity    Alcohol use: No    Drug use: No    Sexual activity: Defer        I reviewed the patient's chief complaint, history of present illness, review of systems, past medical history, surgical history, family history, social history, medications, and allergy list.     Review of Systems     Constitutional: Denies fevers, chills, weight loss  Cardiovascular: Denies chest pain, shortness of breath  Skin: Denies rashes, acute skin changes  Neurologic: Denies headache, loss of consciousness      Vital Signs:   /77   Pulse 77   Ht 152.4 cm (60\")   Wt 134 kg (295 lb)   SpO2 96%   BMI 57.61 kg/m²          Physical Exam  General: Alert. No acute distress    Ortho Exam        LEFT SHOULDER Forward flexion 170. Abduction 100. External rotation 40. Internal rotation SI " joint. Positive Cross body adduction. Supraspinatus strength 4/5. Infraspinatus Strength 4+/5. Infrared subscap 4+/5. Positive Hudson. Positive Neer. Negative Apprehension. Negative Lift off. (Negative Obriens. Sensation intact to light touch, median, radial, ulnar nerve. Tender to palpation to the shoulder, down the arm, bicipital groove and internal and external rotation.  Numbness and tingling down the arm.            Procedures      Imaging Results (Most Recent)       None             Result Review :         XR Scapula Left  Result Date: 5/16/2025  Narrative: X-Ray Report: Left scapula X-Ray Indication: Evaluation of the left scapula AP/Lateral view(s) Findings: Mild AC joint arthritis.  No acute injury or fall.  Prior studies available for comparison: no              Assessment and Plan     Diagnoses and all orders for this visit:    1. Left shoulder pain, unspecified chronicity (Primary)    2. Cervical radiculopathy    3. Impingement syndrome of left shoulder        Discussed the treatment plan with the patient. I reviewed the X-rays that were obtained 5/16/25 with the patient.     MRI of the cervical spine for numbness and tingling.        Call or return if worsening symptoms.    Follow Up     After MRI of the cervical spine.        Patient was given instructions and counseling regarding her condition or for health maintenance advice. Please see specific information pulled into the AVS if appropriate.     Scribed for Judd Youngblood MD by Tracie Brink MA.  06/12/25   15:11 EDT    I have personally performed the services described in this document as scribed by the above individual and it is both accurate and complete. Judd Youngblood MD 06/12/25

## 2025-07-23 ENCOUNTER — HOSPITAL ENCOUNTER (OUTPATIENT)
Dept: MRI IMAGING | Facility: HOSPITAL | Age: 55
Discharge: HOME | End: 2025-07-23
Admitting: ORTHOPAEDIC SURGERY
Payer: COMMERCIAL

## 2025-07-23 DIAGNOSIS — M25.512 LEFT SHOULDER PAIN, UNSPECIFIED CHRONICITY: ICD-10-CM

## 2025-07-23 PROCEDURE — 72141 MRI NECK SPINE W/O DYE: CPT

## 2025-08-14 ENCOUNTER — OFFICE VISIT (OUTPATIENT)
Dept: ORTHOPEDIC SURGERY | Facility: CLINIC | Age: 55
End: 2025-08-14
Payer: COMMERCIAL

## 2025-08-14 VITALS — WEIGHT: 293 LBS | BODY MASS INDEX: 57.52 KG/M2 | HEIGHT: 60 IN

## 2025-08-14 DIAGNOSIS — M48.02 CERVICAL SPINAL STENOSIS: Primary | ICD-10-CM
